# Patient Record
Sex: FEMALE | Race: WHITE
[De-identification: names, ages, dates, MRNs, and addresses within clinical notes are randomized per-mention and may not be internally consistent; named-entity substitution may affect disease eponyms.]

---

## 2020-01-10 ENCOUNTER — HOSPITAL ENCOUNTER (EMERGENCY)
Dept: HOSPITAL 74 - JER | Age: 20
Discharge: HOME | End: 2020-01-10
Payer: SELF-PAY

## 2020-01-10 VITALS — BODY MASS INDEX: 25.7 KG/M2

## 2020-01-10 VITALS — HEART RATE: 76 BPM | TEMPERATURE: 97.3 F | SYSTOLIC BLOOD PRESSURE: 137 MMHG | DIASTOLIC BLOOD PRESSURE: 71 MMHG

## 2020-01-10 DIAGNOSIS — R10.13: ICD-10-CM

## 2020-01-10 DIAGNOSIS — N94.89: Primary | ICD-10-CM

## 2020-01-10 LAB
ALBUMIN SERPL-MCNC: 4 G/DL (ref 3.4–5)
ALP SERPL-CCNC: 106 U/L (ref 45–117)
ALT SERPL-CCNC: 36 U/L (ref 13–61)
ANION GAP SERPL CALC-SCNC: 5 MMOL/L (ref 8–16)
APPEARANCE UR: (no result)
AST SERPL-CCNC: 29 U/L (ref 15–37)
BASOPHILS # BLD: 0.7 % (ref 0–2)
BILIRUB SERPL-MCNC: 0.4 MG/DL (ref 0.2–1)
BILIRUB UR STRIP.AUTO-MCNC: NEGATIVE MG/DL
BUN SERPL-MCNC: 5.4 MG/DL (ref 7–18)
CALCIUM SERPL-MCNC: 9.2 MG/DL (ref 8.5–10.1)
CHLORIDE SERPL-SCNC: 107 MMOL/L (ref 98–107)
CO2 SERPL-SCNC: 27 MMOL/L (ref 21–32)
COLOR UR: YELLOW
CREAT SERPL-MCNC: 0.7 MG/DL (ref 0.55–1.3)
DEPRECATED RDW RBC AUTO: 13.7 % (ref 11.6–15.6)
EOSINOPHIL # BLD: 4.4 % (ref 0–4.5)
GLUCOSE SERPL-MCNC: 91 MG/DL (ref 74–106)
HCT VFR BLD CALC: 40 % (ref 32.4–45.2)
HGB BLD-MCNC: 12.9 GM/DL (ref 10.7–15.3)
KETONES UR QL STRIP: (no result)
LEUKOCYTE ESTERASE UR QL STRIP.AUTO: NEGATIVE
LYMPHOCYTES # BLD: 20.4 % (ref 8–40)
MCH RBC QN AUTO: 26.3 PG (ref 25.7–33.7)
MCHC RBC AUTO-ENTMCNC: 32.2 G/DL (ref 32–36)
MCV RBC: 81.5 FL (ref 80–96)
MONOCYTES # BLD AUTO: 8.6 % (ref 3.8–10.2)
NEUTROPHILS # BLD: 65.9 % (ref 42.8–82.8)
NITRITE UR QL STRIP: NEGATIVE
PH UR: 6 [PH] (ref 5–8)
PLATELET # BLD AUTO: 264 K/MM3 (ref 134–434)
PMV BLD: 9 FL (ref 7.5–11.1)
POTASSIUM SERPLBLD-SCNC: 3.7 MMOL/L (ref 3.5–5.1)
PROT SERPL-MCNC: 7.6 G/DL (ref 6.4–8.2)
PROT UR QL STRIP: NEGATIVE
PROT UR QL STRIP: NEGATIVE
RBC # BLD AUTO: 4.91 M/MM3 (ref 3.6–5.2)
SODIUM SERPL-SCNC: 140 MMOL/L (ref 136–145)
SP GR UR: 1.02 (ref 1.01–1.03)
UROBILINOGEN UR STRIP-MCNC: 1 MG/DL (ref 0.2–1)
WBC # BLD AUTO: 6.4 K/MM3 (ref 4–10)

## 2020-01-10 PROCEDURE — 3E033GC INTRODUCTION OF OTHER THERAPEUTIC SUBSTANCE INTO PERIPHERAL VEIN, PERCUTANEOUS APPROACH: ICD-10-PCS | Performed by: EMERGENCY MEDICINE

## 2020-01-10 PROCEDURE — 3E0333Z INTRODUCTION OF ANTI-INFLAMMATORY INTO PERIPHERAL VEIN, PERCUTANEOUS APPROACH: ICD-10-PCS | Performed by: EMERGENCY MEDICINE

## 2020-01-10 PROCEDURE — 3E033NZ INTRODUCTION OF ANALGESICS, HYPNOTICS, SEDATIVES INTO PERIPHERAL VEIN, PERCUTANEOUS APPROACH: ICD-10-PCS | Performed by: EMERGENCY MEDICINE

## 2020-01-10 NOTE — PDOC
Attending Attestation





- Resident


Resident Name: CindyTamera





- ED Attending Attestation


I have performed the following: I have examined & evaluated the patient, The 

case was reviewed & discussed with the resident, I agree w/resident's findings 

& plan, Exceptions are as noted





- HPI


HPI: 





01/10/20 11:58


19 F with no PMH presents to ED with LUQ pain. Pt has had pain for 3 days now. 

Was evaluated here 2 days ago and had labwork that was normal. Pt was given GI 

cocktail and reports resolution of her pain with those meds. However, pt states 

the pain recurred after she went home. States it is worse with eating. She was 

given a prescription for pepcid, which she states does not help. Pt reports 

nausea and vomiting associated with the pain. Denies diarrhea. Endorses 

constipation. No F/C. No RUQ pain. No blood or coffee ground vomit.





- Physicial Exam


PE: 





01/10/20 12:04


"GENERAL: Awake, alert, and fully oriented, in no acute distress.


HEAD: No signs of trauma


EYES: PERRLA, EOMI, sclera anicteric, conjunctiva clear


ENT: Auricles normal inspection, hearing grossly normal, nares patent, 

oropharynx clear without exudates. Moist mucosa


NECK: Nontender, no stepoffs, Normal ROM, supple, no lymphadenopathy, JVD, or 

masses


LUNGS: Breath sounds equal, clear to auscultation bilaterally.  No wheezes, and 

no crackles


HEART: Regular rate and rhythm, normal S1 and S2, no murmurs, rubs or gallops


ABDOMEN: + LUQ TTP, normoactive bowel sounds.  No guarding, no rebound.  No 

masses


EXTREMITIES: Normal range of motion, no edema.  No clubbing or cyanosis. No 

cords, erythema, or tenderness


NEUROLOGICAL: Cranial nerves II through XII intact. 5/5 strength and sensation 

in all extremities, Normal speech, normal gait, normal cerebellar function


SKIN: Warm, Dry, normal turgor, no rashes or lesions noted.





- Medical Decision Making





01/10/20 12:04


19 F with epigastric and LUQ pain. SUspect gastritis vs PUD.


- Labs, lipase


- GI cocktail


- COnsider CT imaging if pain not improved with GI meds





01/10/20 18:02


Labs wnl


CT obtained, no acute findings other than pelvic congestion syndrome





Will DC with gyn and GI f/u





Pt is well appearing, with normal vitals. Clinically stable for DC at this time.


I discussed the physical exam findings, ancillary test results and final 

diagnoses with the patient. I answered all of the patient's questions. The 

patient was satisfied with the care received and felt comfortable with the 

discharge plan and treatment plan.  The patient agrees to follow up with the 

primary care physician within 24-72 hours.

## 2020-01-10 NOTE — PDOC
History of Present Illness





- General


Chief Complaint: Pain, Acute


Stated Complaint: ABD PAIN


Time Seen by Provider: 01/10/20 11:07


History Source: Patient


Exam Limitations: No Limitations





- History of Present Illness


Initial Comments: 





01/10/20 13:37


19y F presenting for abdominal pain. was here a few days ago. 





Past History





- Past Medical History


Allergies/Adverse Reactions: 


 Allergies











Allergy/AdvReac Type Severity Reaction Status Date / Time


 


pineapple Allergy   Verified 01/10/20 11:04











Home Medications: 


Ambulatory Orders





Famotidine [Pepcid -] 20 mg PO DAILY #14 tablet 01/09/20 


Ondansetron [Zofran -] 4 mg PO BID PRN #10 tablet 01/10/20 








COPD: No


Other medical history: anxiety





- Immunization History


Immunization Up to Date: No





- Psycho Social/Smoking Cessation Hx


Smoking History: Never smoked


Have you smoked in the past 12 months: No


Information on smoking cessation initiated: No


Hx Alcohol Use: No


Drug/Substance Use Hx: No





*Physical Exam





- Vital Signs


 Last Vital Signs











Temp Pulse Resp BP Pulse Ox


 


 98.5 F   75   18   116/69   99 


 


 01/10/20 11:01  01/10/20 11:01  01/10/20 11:01  01/10/20 11:01  01/10/20 11:29














ED Treatment Course





- LABORATORY


CBC & Chemistry Diagram: 


 01/10/20 12:24





 01/10/20 12:24





- ADDITIONAL ORDERS


Additional order review: 


 Laboratory  Results











  01/10/20 01/10/20 01/10/20





  12:24 12:20 12:20


 


Sodium  140  


 


Potassium  3.7  


 


Chloride  107  


 


Carbon Dioxide  27  


 


Anion Gap  5 L  


 


BUN  5.4 L  


 


Creatinine  0.7  


 


Est GFR (CKD-EPI)AfAm  145.58  


 


Est GFR (CKD-EPI)NonAf  125.60  


 


Random Glucose  91  


 


Calcium  9.2  


 


Total Bilirubin  0.4  


 


AST  29  


 


ALT  36  


 


Alkaline Phosphatase  106  


 


Total Protein  7.6  


 


Albumin  4.0  


 


Urine Color   Yellow 


 


Urine Appearance   Cloudy 


 


Urine pH   6.0 


 


Ur Specific Gravity   1.024 


 


Urine Protein   Negative 


 


Urine Glucose (UA)   Negative 


 


Urine Ketones   4+ H 


 


Urine Blood   Negative 


 


Urine Nitrite   Negative 


 


Urine Bilirubin   Negative 


 


Urine Urobilinogen   1.0 


 


Ur Leukocyte Esterase   Negative 


 


Urine HCG, Qual    Negative








 











  01/10/20





  12:24


 


RBC  4.91


 


MCV  81.5


 


MCHC  32.2


 


RDW  13.7


 


MPV  9.0


 


Neutrophils %  65.9


 


Lymphocytes %  20.4  D


 


Monocytes %  8.6


 


Eosinophils %  4.4


 


Basophils %  0.7














- RADIOLOGY


Radiology Studies Ordered: 














 Category Date Time Status


 


 ABDOMEN & PELVIS CT WITH CONTR [CT] Stat CT Scan  01/10/20 13:36 Ordered














- Medications


Given in the ED: 


ED Medications














Discontinued Medications














Generic Name Dose Route Start Last Admin





  Trade Name Freq  PRN Reason Stop Dose Admin


 


Acetaminophen  1,000 mg  01/10/20 11:48  01/10/20 12:39





  Ofirmev Injection -  IVPB  01/10/20 11:49  1,000 mg





  ONCE ONE   Administration





     





     





     





     


 


Al Hydroxide/Mg Hydroxide  30 ml  01/10/20 11:48  01/10/20 12:39





  Mylanta Oral Suspension -  PO  01/10/20 11:49  30 ml





  ONCE ONE   Administration





     





     





     





     


 


Famotidine/Sodium Chloride  20 mg in 50 mls @ 100 mls/hr  01/10/20 11:48  01/10/

20 12:39





  Pepcid 20 Mg Premixed Ivpb -  IVPB  01/10/20 12:17  100 mls/hr





  ONCE ONE   Administration





     





     





     





     


 


Sodium Chloride  1,000 mls @ 1,000 mls/hr  01/10/20 11:48  01/10/20 12:39





  Normal Saline -  IV  01/10/20 12:47  1,000 mls/hr





  ASDIR STA   Administration





     





     





     





     


 


Ondansetron HCl  4 mg  01/10/20 11:48  01/10/20 12:39





  Zofran Injection  IVPB  01/10/20 11:49  4 mg





  ONCE ONE   Administration





     





     





     





     














Medical Decision Making





- Medical Decision Making





01/10/20 18:06


toleratring po





Discharge





- Discharge Information


Problems reviewed: Yes


Clinical Impression/Diagnosis: 


 Pelvic congestion syndrome





Abdominal pain


Qualifiers:


 Abdominal location: upper abdomen, unspecified Qualified Code(s): R10.10 - 

Upper abdominal pain, unspecified





Condition: Improved


Disposition: HOME





- Admission


No





- Additional Discharge Information


Prescriptions: 


Ondansetron [Zofran -] 4 mg PO BID PRN #10 tablet


 PRN Reason: Nausea





- Follow up/Referral


Referrals: 


Nikky Toledo DO [Staff Physician] - 


Wilver Glasgow MD [Staff Physician] - 


Geremias Mclean DO [Staff Physician] - 


James Singleton MD [Staff Physician] - 


Gracie Fry MD [Staff Physician] - 


Navid Lopez MD [Staff Physician] - 


Shanon Benitez MD [Staff Physician] - 


Ramez Hanson MD [Staff Physician] - 





- Patient Discharge Instructions


Patient Printed Discharge Instructions:  DI for Gastritis, DI for Abdominal Pain

-Adult


Additional Instructions: 


You most likely have gastritis. 


You also may have pelvic congestion syndrome; this is not anything urgent or 

dangerous. 





Take the Pepcid as directed and take the Zofran as needed for nausea (follow 

prescription directions). 


I recommend refraining from fried, acidic and fatty foods for the time being. 

Keep yourself well hydrated. 





Referrals to GI and Ob/Gyn have been provided. 





Come back to the emergency room if pain worsens, you are continuously throwing 

up or if any new or concerning symptom develops,. 





Thank you





- Post Discharge Activity